# Patient Record
Sex: MALE | Race: WHITE | NOT HISPANIC OR LATINO | URBAN - METROPOLITAN AREA
[De-identification: names, ages, dates, MRNs, and addresses within clinical notes are randomized per-mention and may not be internally consistent; named-entity substitution may affect disease eponyms.]

---

## 2024-03-19 ENCOUNTER — OFFICE VISIT (OUTPATIENT)
Dept: OTOLARYNGOLOGY | Facility: CLINIC | Age: 29
End: 2024-03-19
Payer: COMMERCIAL

## 2024-03-19 VITALS — BODY MASS INDEX: 25.1 KG/M2 | WEIGHT: 170 LBS

## 2024-03-19 DIAGNOSIS — J34.2 DEVIATED SEPTUM: Primary | ICD-10-CM

## 2024-03-19 DIAGNOSIS — G47.33 OSA (OBSTRUCTIVE SLEEP APNEA): ICD-10-CM

## 2024-03-19 DIAGNOSIS — J31.0 CHRONIC RHINITIS: ICD-10-CM

## 2024-03-19 PROCEDURE — 99204 OFFICE O/P NEW MOD 45 MIN: CPT | Performed by: GENERAL PRACTICE

## 2024-03-19 PROCEDURE — 1036F TOBACCO NON-USER: CPT | Performed by: GENERAL PRACTICE

## 2024-03-19 PROCEDURE — 31575 DIAGNOSTIC LARYNGOSCOPY: CPT | Performed by: GENERAL PRACTICE

## 2024-03-19 ASSESSMENT — PATIENT HEALTH QUESTIONNAIRE - PHQ9
SUM OF ALL RESPONSES TO PHQ9 QUESTIONS 1 AND 2: 0
1. LITTLE INTEREST OR PLEASURE IN DOING THINGS: NOT AT ALL
2. FEELING DOWN, DEPRESSED OR HOPELESS: NOT AT ALL

## 2024-03-20 NOTE — PROGRESS NOTES
Otolaryngology - Head and Neck Surgery Outpatient New Patient Visit Note        Assessment/Plan   Problem List Items Addressed This Visit    None  Visit Diagnoses         Codes    Deviated septum    -  Primary J34.2    Chronic rhinitis     J31.0    YUNI (obstructive sleep apnea)     G47.33          28yoM with chronic nasal obstruction and symptoms of YUNI in the setting of deviated septum and chronic rhinitis.  Limited effect from prior use of topical steroids and saline irrigation for months.   Discussed ongoing use of flonase and saline gel.      Discussed possible PSG for YUNI.   Discussed possible septoturbinoplasty and pt would like to consider.     Risks, benefits and indications of the procedure were discussed.  This included discussion of risks of pain, bleeding, infection, scarring with poor functional or cosmetic result, need for future procedures, or damage to surrounding structures to include regional blood vessels and nerves.  In particular, the risk of ongoing obstruction, CSF leak was highlighted.  All questions were answered.  The patient/caregiver indicated understanding of the discussion and elected to proceed with the procedure if insurance will contribute.  Will schedule as available.       Follow up:  -plan for follow up in clinic as needed    All of the above findings, impressions, treatment planning and follow up plans were discussed with the patient who indicated understanding.  the patient was instructed to contact or return to clinic sooner if symptoms/signs persist or worsen despite the above management.      Alexander Gatica MD  Otolaryngology - Head and Neck Surgery            History Of Present Illness  Rafa King is a 28 y.o. male presenting for chronic nasal obstruction/congestion contributing to sleep difficulties.     Reports chronic nasal obstruction b/l with some exacerbation with seasonal changes.     Notes a history of nasal trauma but uncertain if prior fractures.  No prior nasal  surgery.    Reports congestion mildly improved with use of flonase and saline sprays over months, but no significant relief.     Rare acute sinusitsi  No obvious allergic rhinitis symptoms otherwise.     Reports snoring, witnessed apneic pauses and daytime somnolence.    No PSG on file.               Past Medical History  He has no past medical history on file.    Surgical History  He has a past surgical history that includes Other surgical history (06/28/2020).     Social History  He reports that he has never smoked. He has never used smokeless tobacco. No history on file for alcohol use and drug use.    Family History  No family history on file.     Allergies  Patient has no known allergies.    Review of Systems  ROS: Pertinent positives as noted in HPI.    - CONSTITUTIONAL: Does not report weight loss, fever or chills.    - HEENT:   Ear: Does not report tinnitus, vertigo, hearing loss, otalgia, otorrhea  Nose: Does not report  ,  , epistaxis, decreased smell  Throat: Does not report pain, dysphagia, odynophagia  Larynx: Does not report hoarseness,  difficulty breathing, pain with speaking (odynophonia)  Neck: Does not report new masses, pain, swelling  Face: Does not report sinus pain, pressure, swelling, numbness, weakness     - RESPIRATORY: Does not report SOB or cough.    - CV: Does not report palpitations or chest pain.     - GI: Does not report abdominal pain, nausea, vomiting or diarrhea.    - : Does not report dysuria or urinary frequency.    - MSK: Does not report myalgia or joint pain.    - SKIN: Does not report rash or pruritus.    - NEUROLOGICAL: Does not report headache or syncope.    - PSYCHIATRIC: Does not report recent changes in mood. Does not report anxiety or depression.         Physical Exam:     GENERAL:   Alert & Oriented to person, place and time; Normal affect and appearance. Well developed and well nourished. Conversant & cooperative with examination.     HEAD:   Normocephalic,  atraumatic. No sinus tenderness to palpation. Normal parotid bilaterally. Normal facial strength.     NEUROLOGIC:   Cranial nerves II-XII grossly intact, gait WNL. Normal mood and affect.    EYES:   Extraocular movements intact. Pupils equal, round, reactive to light and accommodation. No nystagmus, no ptosis. no scleral injection.    EAR:   Normal auricle. No discomfort or TTP with manipulation.   Handheld otoscopic exam showed normal external auditory canals bilaterally. No purulence or EAC inflammation. Minimal cerumen.   Right tympanic membrane clear and mobile without evidence of perforation, retraction or middle ear effusion.   Left tympanic membrane clear and mobile without evidence of perforation, retraction or middle ear effusion.     NOSE:   No external deformity. No external nasal lesions, lacerations, or scars. Nasal tip symmetrical with normal nasal valves.   Nasal cavity with severe rightward septum, edematous  mucosa and hypertrophic turbinates. No lesions, masses, purulence or polyps.     OC/OP:   Mucous membranes moist, no masses, lesions or exudates.   Normal tongue, floor of mouth, teeth, gums, lips. Normal posterior pharyngeal wall.    Normal tonsils without erythema, exudate or obvious calculi     NECK:   No neck masses or thyroid enlargement. Trachea midline. No tenderness to palpation    LYMPHATIC:   No cervical lymphadenopathy.     RESPIRATORY:   Symmetric chest elevation & no retractions. No significant hoarseness. No increased work of breathing.    CV:   No clubbing or cyanosis. No obvious edema    Skin:   No facial rashes, vesicles or lesions.     Extremities:   No gross abnormalities      Clinic Procedure    Nasal Endoscopy           Nasal Endoscopy Indication:    Risks, benefits, and alternatives, infection risk, bleeding risk and risk of mucosal trauma and pain were discussed with the patient. Verbal consent was obtained prior to the procedure and is detailed in the patient's record.      Procedure Note:      With the patient seated in the exam chair, the bilateral nasal cavity was topically treated with 4% lidocaine and phenylephrine.  The bilateral nasal cavity was intubated with the  flexible laryngoscope.   Nasal Endoscopy: Nasal endoscopy was successfully completed using the endoscope and the nasal mucosa, septum, turbinates, and osteomeatal complex were examined.  The spheno-ethmoid recess was examined.          Patient Status: the patient tolerated the procedure well.   Complications: there were no complications.   Significant/abnormal findings: No significant abnormal findings during the above exam, except as listed in physical exam       Information review:  External sources (notes, imaging, lab results) listed below personally reviewed to aid in medical decision making process.  -  -  -

## 2024-11-13 ENCOUNTER — APPOINTMENT (OUTPATIENT)
Dept: PRIMARY CARE | Facility: CLINIC | Age: 29
End: 2024-11-13
Payer: COMMERCIAL

## 2024-11-13 ASSESSMENT — ENCOUNTER SYMPTOMS
HYPERTENSION: 1
PALPITATIONS: 1
BLURRED VISION: 0
PND: 0
SHORTNESS OF BREATH: 0
HEADACHES: 0
NECK PAIN: 0
ORTHOPNEA: 0
SWEATS: 1

## 2024-11-15 PROBLEM — R01.1 HEART MURMUR: Status: ACTIVE | Noted: 2024-11-15

## 2024-11-18 ENCOUNTER — OFFICE VISIT (OUTPATIENT)
Dept: CARDIOLOGY | Facility: CLINIC | Age: 29
End: 2024-11-18
Payer: COMMERCIAL

## 2024-11-18 VITALS
WEIGHT: 174 LBS | HEART RATE: 59 BPM | SYSTOLIC BLOOD PRESSURE: 133 MMHG | BODY MASS INDEX: 25.77 KG/M2 | HEIGHT: 69 IN | OXYGEN SATURATION: 97 % | DIASTOLIC BLOOD PRESSURE: 73 MMHG

## 2024-11-18 DIAGNOSIS — R01.1 HEART MURMUR: ICD-10-CM

## 2024-11-18 DIAGNOSIS — I10 ESSENTIAL HYPERTENSION: Primary | ICD-10-CM

## 2024-11-18 PROCEDURE — 99203 OFFICE O/P NEW LOW 30 MIN: CPT | Performed by: STUDENT IN AN ORGANIZED HEALTH CARE EDUCATION/TRAINING PROGRAM

## 2024-11-18 PROCEDURE — 3074F SYST BP LT 130 MM HG: CPT | Performed by: STUDENT IN AN ORGANIZED HEALTH CARE EDUCATION/TRAINING PROGRAM

## 2024-11-18 PROCEDURE — 99213 OFFICE O/P EST LOW 20 MIN: CPT | Performed by: STUDENT IN AN ORGANIZED HEALTH CARE EDUCATION/TRAINING PROGRAM

## 2024-11-18 PROCEDURE — 93005 ELECTROCARDIOGRAM TRACING: CPT | Performed by: STUDENT IN AN ORGANIZED HEALTH CARE EDUCATION/TRAINING PROGRAM

## 2024-11-18 PROCEDURE — 3008F BODY MASS INDEX DOCD: CPT | Performed by: STUDENT IN AN ORGANIZED HEALTH CARE EDUCATION/TRAINING PROGRAM

## 2024-11-18 PROCEDURE — 3078F DIAST BP <80 MM HG: CPT | Performed by: STUDENT IN AN ORGANIZED HEALTH CARE EDUCATION/TRAINING PROGRAM

## 2024-11-18 RX ORDER — TELMISARTAN 80 MG/1
80 TABLET ORAL NIGHTLY
Qty: 90 TABLET | Refills: 3 | Status: SHIPPED | OUTPATIENT
Start: 2024-11-18 | End: 2025-11-18

## 2024-11-18 RX ORDER — NEBIVOLOL 5 MG/1
10 TABLET ORAL NIGHTLY
COMMUNITY
End: 2024-11-18 | Stop reason: SDUPTHER

## 2024-11-18 RX ORDER — NEBIVOLOL 5 MG/1
10 TABLET ORAL NIGHTLY
Qty: 180 TABLET | Refills: 3 | Status: SHIPPED | OUTPATIENT
Start: 2024-11-18 | End: 2025-11-18

## 2024-11-18 RX ORDER — TELMISARTAN 80 MG/1
80 TABLET ORAL NIGHTLY
COMMUNITY
End: 2024-11-18 | Stop reason: SDUPTHER

## 2024-11-18 ASSESSMENT — ENCOUNTER SYMPTOMS
OCCASIONAL FEELINGS OF UNSTEADINESS: 0
DEPRESSION: 0
LOSS OF SENSATION IN FEET: 0

## 2024-11-18 ASSESSMENT — PATIENT HEALTH QUESTIONNAIRE - PHQ9
2. FEELING DOWN, DEPRESSED OR HOPELESS: NOT AT ALL
1. LITTLE INTEREST OR PLEASURE IN DOING THINGS: NOT AT ALL
SUM OF ALL RESPONSES TO PHQ9 QUESTIONS 1 AND 2: 0

## 2024-11-18 ASSESSMENT — COLUMBIA-SUICIDE SEVERITY RATING SCALE - C-SSRS
2. HAVE YOU ACTUALLY HAD ANY THOUGHTS OF KILLING YOURSELF?: NO
6. HAVE YOU EVER DONE ANYTHING, STARTED TO DO ANYTHING, OR PREPARED TO DO ANYTHING TO END YOUR LIFE?: NO
1. IN THE PAST MONTH, HAVE YOU WISHED YOU WERE DEAD OR WISHED YOU COULD GO TO SLEEP AND NOT WAKE UP?: NO

## 2024-11-18 ASSESSMENT — PAIN SCALES - GENERAL: PAINLEVEL_OUTOF10: 0-NO PAIN

## 2024-11-18 NOTE — PROGRESS NOTES
Location of visit: [address]   Type of Visit: New    Chief Complaint:  Patient was referred to Cardiology for high blood pressure.    History Of Present Illness:    Rafa King is a 29 y.o. male, with history significant for HTN diagnosed in 2024, YUNI, associated to deviated septum, who visits Cardiology today as a new patient  for high blood pressure. 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 Using a blood pressure machine at home he measured multiple blood pressures in the 140/80 and 150/90 mmHg range so started treatment initially with telmisartan 40 mg without response, double it to 80 mg every day and then added nebivolol 5, also doubling to 10 mg every day due to persistent elevated range. He tested his machine with his wife showed low normal parameters so believe there is no malfunction. His parameters have continued to be elevated so decided to come to review his treatment.    Patient denies chest pain, dyspnea on exertion, shortness of breath, orthopnea, PND, nocturia, edema, palpitations, dizziness, lightheadedness, syncope, claudication, or snoring/apnea.    Blood pressure: 133/73 mmHg  HR: 59 bpm    Today's ECG shows sinus rhythm at 59 bpm, normal AV conduction, and normal ventricular repolarization.    Past Medical History:  He has no past medical history on file.    Past Surgical History:  He has a past surgical history that includes Other surgical history (06/28/2020).    Social History:  He reports that he has never smoked. He has never used smokeless tobacco. He reports that he does not drink alcohol and does not use drugs.    Family History:  No family history on file.    Allergies:  Patient has no known allergies.    Outpatient Medications:  Current Outpatient Medications   Medication Instructions    nebivolol (BYSTOLIC) 10 mg, Nightly    telmisartan (MICARDIS) 80 mg, Nightly     Last Recorded Vitals:  Vitals:    11/18/24 1652  "11/18/24 1653   BP: 122/71 133/73   BP Location: Left arm Right arm   Patient Position: Sitting Sitting   BP Cuff Size: Large adult Large adult   Pulse: 59    SpO2: 97%    Weight: 78.9 kg (174 lb)    Height: 1.753 m (5' 9\")      Physical Exam:      11/18/2024     4:53 PM 11/18/2024     4:52 PM 3/21/2024     2:01 PM 3/19/2024     8:24 AM 1/26/2023     2:57 PM 6/26/2020     1:38 PM   Vitals   Systolic 133 122 165  132 118   Diastolic 73 71 74  79 70   Heart Rate  59 84  62 83   Temp   36.7 °C (98.1 °F)  36 °C (96.8 °F) 36.7 °C (98 °F)   Resp   16  14 16   Height (in)  1.753 m (5' 9\")   1.753 m (5' 9\") 1.753 m (5' 9\")   Weight (lb)  174 173.94 170 148 146.5   BMI  25.7 kg/m2 25.69 kg/m2 25.1 kg/m2 21.86 kg/m2 21.63 kg/m2   BSA (m2)  1.96 m2 1.96 m2 1.94 m2 1.81 m2 1.8 m2   Visit Report Report Report  Report       Wt Readings from Last 5 Encounters:   11/18/24 78.9 kg (174 lb)   03/21/24 78.9 kg (173 lb 15.1 oz)   03/19/24 77.1 kg (170 lb)   01/26/23 67.1 kg (148 lb)   06/26/20 66.5 kg (146 lb 8 oz)     General: Sitting up comfortably in chair; in no apparent distress.  HEENT: Normocephalic; atraumatic. Well hydrated.  Eyes: Anicteric sclera. Extraocular movement intact.  Neck: Supple; no thyromegaly; normal jugular venous pressure, no bruits.  Respiratory: Bilateral air entry equal. No wheezing.  Cardiovascular: Normal S1, S2; no murmurs auscultated.  Abdomen: Nondistended; nontender. (+) bowel sounds.  Extremities: No peripheral edema present. Pulses 2+ diffusely.  Neurological: Oriented to time, place, and person; nonfocal.  Psychiatric: Normal affect.     Last Labs Reviewed:  No results found.    Last Cardiology/Imaging Tests Personally Reviewed (if images available) and Interpreted:  ECG:  No results found.    Echocardiogram:  Transthoracic Echocardiogram; 7/1/2020  1. The left ventricular systolic function is normal with a 60-65% estimated ejection fraction.  2. No prior echocardiogram available for " comparison.    Cath:  No results found.    Stress Test:  No results found.    Cardiac CT/MRI:  No results found.    CV RISK FACTORS:   # Hypertension: Last BP: 133/73.  # Hyperlipidemia: Last Tchol No results found for requested labs within last 365 days. / LDL No results found for requested labs within last 365 days. / HDL No results found for requested labs within last 365 days. / TRIG No results found for requested labs within last 365 days. (No results in last year.).  # Type II Diabetes Mellitus: Last A1c No results found for requested labs within last 365 days. (No results in last year.).  # Obesity: Last BMI: 25.68.  # CKD: Last BUN/Cr (GFR): No results found for requested labs within last 365 days./No results found for requested labs within last 365 days. (No results found for requested labs within last 365 days.), No results in last year..    ASCV RISK:  The ASCVD Risk score (Cortney DK, et al., 2019) failed to calculate for the following reasons:    The 2019 ASCVD risk score is only valid for ages 40 to 79    Assessment:  29 y.o. male, with history significant for HTN diagnosed in 2024, YUNI, associated to deviated septum, who visits Cardiology today as a new patient  for high blood pressure. His blood pressure appears well controlled. Will request a CMP to assess kidney function with actual treatment and continue to monitor ranges at home. If still elevated will ask for a stress test to assess blood pressure response and plan to ruled out secondary hypertension.   Assessment & Plan  Essential hypertension  - Continue actual treatment  - Monitor blood pressure at home  - If still elevated plan a stress test and study to rule out secondary cause    I will contact the patient once the results are available through [employer]  I spent 32 minutes assessing the case between pre-charting, face-to-face patient interaction, and documentation    Adithya Carr MD

## 2024-11-19 LAB
ATRIAL RATE: 59 BPM
P AXIS: 45 DEGREES
P OFFSET: 188 MS
P ONSET: 131 MS
PR INTERVAL: 172 MS
Q ONSET: 217 MS
QRS COUNT: 10 BEATS
QRS DURATION: 96 MS
QT INTERVAL: 374 MS
QTC CALCULATION(BAZETT): 370 MS
QTC FREDERICIA: 372 MS
R AXIS: 42 DEGREES
T AXIS: 18 DEGREES
T OFFSET: 404 MS
VENTRICULAR RATE: 59 BPM

## 2024-11-26 PROBLEM — R01.1 HEART MURMUR: Status: RESOLVED | Noted: 2024-11-15 | Resolved: 2024-11-26

## 2024-11-27 NOTE — ASSESSMENT & PLAN NOTE
- Continue actual treatment  - Monitor blood pressure at home  - If still elevated plan a stress test and study to rule out secondary cause

## 2025-06-12 DIAGNOSIS — I10 ESSENTIAL HYPERTENSION: ICD-10-CM

## 2025-06-12 RX ORDER — TELMISARTAN 80 MG/1
80 TABLET ORAL DAILY
Qty: 90 TABLET | Refills: 3 | Status: SHIPPED | OUTPATIENT
Start: 2025-06-12 | End: 2026-06-12

## 2025-06-12 RX ORDER — NEBIVOLOL 5 MG/1
10 TABLET ORAL DAILY
Qty: 180 TABLET | Refills: 3 | Status: SHIPPED | OUTPATIENT
Start: 2025-06-12 | End: 2026-06-12